# Patient Record
Sex: FEMALE | Race: WHITE | NOT HISPANIC OR LATINO
[De-identification: names, ages, dates, MRNs, and addresses within clinical notes are randomized per-mention and may not be internally consistent; named-entity substitution may affect disease eponyms.]

---

## 2018-10-17 ENCOUNTER — TRANSCRIPTION ENCOUNTER (OUTPATIENT)
Age: 33
End: 2018-10-17

## 2021-04-17 ENCOUNTER — APPOINTMENT (OUTPATIENT)
Age: 36
End: 2021-04-17

## 2021-05-10 ENCOUNTER — APPOINTMENT (OUTPATIENT)
Dept: NEUROSURGERY | Facility: CLINIC | Age: 36
End: 2021-05-10
Payer: COMMERCIAL

## 2021-05-10 VITALS
HEIGHT: 64 IN | TEMPERATURE: 98 F | BODY MASS INDEX: 26.98 KG/M2 | DIASTOLIC BLOOD PRESSURE: 84 MMHG | HEART RATE: 90 BPM | WEIGHT: 158 LBS | SYSTOLIC BLOOD PRESSURE: 120 MMHG

## 2021-05-10 DIAGNOSIS — M51.16 INTERVERTEBRAL DISC DISORDERS WITH RADICULOPATHY, LUMBAR REGION: ICD-10-CM

## 2021-05-10 PROBLEM — Z00.00 ENCOUNTER FOR PREVENTIVE HEALTH EXAMINATION: Status: ACTIVE | Noted: 2021-05-10

## 2021-05-10 PROCEDURE — 99072 ADDL SUPL MATRL&STAF TM PHE: CPT

## 2021-05-10 PROCEDURE — 99204 OFFICE O/P NEW MOD 45 MIN: CPT

## 2021-05-10 RX ORDER — LEVOTHYROXINE SODIUM 137 UG/1
TABLET ORAL
Refills: 0 | Status: ACTIVE | COMMUNITY

## 2021-05-10 RX ORDER — ESCITALOPRAM OXALATE 5 MG/1
TABLET, FILM COATED ORAL
Refills: 0 | Status: ACTIVE | COMMUNITY

## 2021-05-10 NOTE — RESULTS/DATA
[FreeTextEntry1] : [place] MRI shows a herniated disc L5-S1 paracentral to the left with extruded fragment inferiorly.

## 2021-05-10 NOTE — PLAN
[FreeTextEntry1] : \par DIAGNOSIS:  LUMBAR DISK HERNIATION  / STENOSIS L5 S1  LEFT \par TREATMENT PLAN:  NON-SURGICAL  VS    L5  S1  LEFT   MICRODISKECTOMY \par \par This is a patient with lumbar herniated disk and radiculopathy.   I have recommended nonsurgical management at this time.  This consists of physical therapy and/or manual medicine in conjunction to medical therapy and other conservative methods.  These include the consideration of trigger point injections and or the utilization of modalities such as TENS where applicable.  The next tier would be the referral to a pain management specialist (anesthesia or physiatry) for the consideration of spinal injections.  This includes the options of epidural steroids, facet injections as well as other novel techniques that may provide pain relief.  \par \par If all nonsurgical methods fail , I have recommended lumbar microdiskectomy  as a treatment option.  This entails removing the lamina and the medial facet joint along with the underlying hypertrophied ligamentum flavum and retrieving the herniated disk fragment as well as removing any weakened or damaged disk material at this level.  This will allow for a widening of the spinal canal and the neuroforamen at the effected level thus decompressing the nerve root. This should not result in added instability to the spine at this level.  This will not require the need for instrumented stabilization and fusion. \par \par Risks and benefits of surgery were described to the patient in detail which include but not excluding those otherwise not mentioned:  coma paralysis, death, stroke, spinal fluid leak, nerve injury, weakness, infection, spinal instability, vascular injury, re-herniation, adjacent segment degeneration and persistent pain.   \par \par I have reviewed the images in detail with the patient today in my office and have answered all questions regarding this condition to the best of my ability to the patient’s satisfaction.

## 2021-05-10 NOTE — REASON FOR VISIT
[New Patient Visit] : a new patient visit [FreeTextEntry1] : Lower back pain, left sided- [facility] imaging

## 2021-05-10 NOTE — PHYSICAL EXAM
[FreeTextEntry6] : She has trace weakness of dorsiflexion on the left foot [Straight-Leg Raise Test - Left] : straight leg raise of the left leg was positive [Normal] : normal [Intact] : all sensory within normal limits bilaterally

## 2021-05-10 NOTE — HISTORY OF PRESENT ILLNESS
[> 3 months] : more  than 3 months [FreeTextEntry1] : LEFT  HNP   [de-identified] : \par This is a pleasant 35-year-old here for evaluation of her lumbar spine. She states that she had excruciating back and leg pain on the left after [place]. She recalls is because of snow on a gradual advancing shoveling the pain worsened to the point where she saw her primary doctor. An MRI was denied until she did physical therapy which she did do. She improved slightly. She then walked on the beach after she was feeling better and her back and leg pain recurred quite vigorously. This point MRI was approved which was performed. She's here for my evaluation currently. Her pain at the time of that recurrence was 9 on a 10 scale. Today it is about 3/10 scale. She taking anti-inflammatories over-the-counter currently. She has no symptoms of cauda equina syndrome. She has what sounds like a left L5 or S1 radiculopathy clinically.\par \par \par LEFT sciatic pain  [place]\par recurred after PT and walked on [place]\par \par \par EFRAIN [place]

## 2021-06-04 DIAGNOSIS — Z01.818 ENCOUNTER FOR OTHER PREPROCEDURAL EXAMINATION: ICD-10-CM

## 2021-06-05 ENCOUNTER — APPOINTMENT (OUTPATIENT)
Dept: DISASTER EMERGENCY | Facility: CLINIC | Age: 36
End: 2021-06-05

## 2021-06-06 LAB — SARS-COV-2 N GENE NPH QL NAA+PROBE: NOT DETECTED

## 2021-06-08 ENCOUNTER — OUTPATIENT (OUTPATIENT)
Dept: OUTPATIENT SERVICES | Facility: HOSPITAL | Age: 36
LOS: 1 days | End: 2021-06-08

## 2022-12-29 ENCOUNTER — NON-APPOINTMENT (OUTPATIENT)
Age: 37
End: 2022-12-29

## 2025-06-02 ENCOUNTER — NON-APPOINTMENT (OUTPATIENT)
Age: 40
End: 2025-06-02